# Patient Record
Sex: MALE | Race: WHITE | NOT HISPANIC OR LATINO | Employment: FULL TIME | URBAN - METROPOLITAN AREA
[De-identification: names, ages, dates, MRNs, and addresses within clinical notes are randomized per-mention and may not be internally consistent; named-entity substitution may affect disease eponyms.]

---

## 2023-02-13 PROBLEM — G47.00 INSOMNIA: Status: ACTIVE | Noted: 2023-02-13

## 2023-02-13 PROBLEM — F41.9 ANXIETY: Status: ACTIVE | Noted: 2023-02-13

## 2023-02-13 PROBLEM — E66.3 OVERWEIGHT WITH BODY MASS INDEX (BMI) OF 26 TO 26.9 IN ADULT: Status: ACTIVE | Noted: 2023-02-13

## 2023-02-13 RX ORDER — ESCITALOPRAM OXALATE 10 MG/1
1 TABLET ORAL DAILY
COMMUNITY
Start: 2022-05-04 | End: 2023-03-24 | Stop reason: SDUPTHER

## 2023-03-24 ENCOUNTER — OFFICE VISIT (OUTPATIENT)
Dept: PRIMARY CARE | Facility: CLINIC | Age: 38
End: 2023-03-24
Payer: COMMERCIAL

## 2023-03-24 VITALS
SYSTOLIC BLOOD PRESSURE: 122 MMHG | HEART RATE: 93 BPM | TEMPERATURE: 97 F | DIASTOLIC BLOOD PRESSURE: 86 MMHG | HEIGHT: 70 IN | BODY MASS INDEX: 26.4 KG/M2 | WEIGHT: 184.4 LBS

## 2023-03-24 DIAGNOSIS — G47.00 INSOMNIA, UNSPECIFIED TYPE: ICD-10-CM

## 2023-03-24 DIAGNOSIS — F41.9 ANXIETY: Primary | ICD-10-CM

## 2023-03-24 PROCEDURE — 1036F TOBACCO NON-USER: CPT | Performed by: NURSE PRACTITIONER

## 2023-03-24 PROCEDURE — 99213 OFFICE O/P EST LOW 20 MIN: CPT | Performed by: NURSE PRACTITIONER

## 2023-03-24 RX ORDER — ESCITALOPRAM OXALATE 10 MG/1
10 TABLET ORAL DAILY
Qty: 90 TABLET | Refills: 1 | Status: SHIPPED | OUTPATIENT
Start: 2023-03-24

## 2023-03-24 ASSESSMENT — ENCOUNTER SYMPTOMS
VOMITING: 0
DIARRHEA: 0
ABDOMINAL PAIN: 0
COUGH: 0
NAUSEA: 0
CHILLS: 0
WEAKNESS: 0
DIZZINESS: 0
PALPITATIONS: 0
FEVER: 0
SHORTNESS OF BREATH: 0
HEADACHES: 0
FATIGUE: 0
CHEST TIGHTNESS: 0
NUMBNESS: 0

## 2023-03-24 NOTE — PATIENT INSTRUCTIONS
Continue to focus on a healthy diet and exercise.   Continue medications at the current dose.  Follow up in 6 months.

## 2023-03-24 NOTE — PROGRESS NOTES
"Subjective   Jeovany Olivera is a 37 y.o. male who presents for Follow-up (Med refills).    HPI   He reports he has been doing well.  Taking the medication as prescribed. Taking daily at night time before bed  Side effects: No side effects noted.  No feeling down, helpless or hopeless.  Motivation: Overall pretty good.  No SI or HI.  No excessive worry.  No worry about worst case scenarios.  Panic attacks: No panic attacks.  Still irritable at times (work related)- overall much better.  Counseling: No counseling.  Sleep: No problems falling asleep or staying asleep. Sleeps about 6-7 hours a night. Significant improvement.  Diet: Overall pretty healthy.  Exercise: Walks laps around the building for lunch time (1.5 miles)- daily during the week.  Caffeine use: 1.5 cups of coffee every morning.  Alcohol use: (+) social on the weekends  Drug use: No drug use.    Review of Systems   Constitutional:  Negative for chills, fatigue and fever.   Eyes:  Negative for visual disturbance.   Respiratory:  Negative for cough, chest tightness and shortness of breath.    Cardiovascular:  Negative for chest pain, palpitations and leg swelling.   Gastrointestinal:  Negative for abdominal pain, diarrhea, nausea and vomiting.   Neurological:  Negative for dizziness, weakness, numbness and headaches.       Objective   /86 (BP Location: Left arm, Patient Position: Sitting)   Pulse 93   Temp 36.1 °C (97 °F) (Temporal)   Ht 1.778 m (5' 10\")   Wt 83.6 kg (184 lb 6.4 oz)   BMI 26.46 kg/m²     Physical Exam  Constitutional:       General: He is not in acute distress.     Appearance: Normal appearance. He is not toxic-appearing.   Eyes:      Extraocular Movements: Extraocular movements intact.      Conjunctiva/sclera: Conjunctivae normal.      Pupils: Pupils are equal, round, and reactive to light.   Cardiovascular:      Rate and Rhythm: Normal rate and regular rhythm.      Pulses: Normal pulses.      Heart sounds: Normal heart " sounds, S1 normal and S2 normal. No murmur heard.  Pulmonary:      Effort: Pulmonary effort is normal. No respiratory distress.      Breath sounds: Normal breath sounds.   Abdominal:      General: Abdomen is flat. Bowel sounds are normal.      Palpations: Abdomen is soft.      Tenderness: There is no abdominal tenderness.   Musculoskeletal:      Cervical back: Normal range of motion.      Right lower leg: No edema.      Left lower leg: No edema.   Lymphadenopathy:      Cervical: No cervical adenopathy.   Neurological:      Mental Status: He is alert and oriented to person, place, and time.   Psychiatric:         Attention and Perception: Attention normal.         Mood and Affect: Mood and affect normal.         Behavior: Behavior normal. Behavior is cooperative.         Thought Content: Thought content normal.         Cognition and Memory: Cognition normal.         Judgment: Judgment normal.         Assessment/Plan   Problem List Items Addressed This Visit          Nervous    Insomnia     Significant improvement since starting the Lexapro.            Other    Anxiety - Primary     Well controlled on Lexapro. Continue on current dose.         Relevant Medications    escitalopram (Lexapro) 10 mg tablet

## 2023-09-13 ENCOUNTER — OFFICE VISIT (OUTPATIENT)
Dept: PRIMARY CARE | Facility: CLINIC | Age: 38
End: 2023-09-13
Payer: COMMERCIAL

## 2023-09-13 VITALS
DIASTOLIC BLOOD PRESSURE: 82 MMHG | HEART RATE: 78 BPM | HEIGHT: 69 IN | BODY MASS INDEX: 27.88 KG/M2 | WEIGHT: 188.2 LBS | OXYGEN SATURATION: 96 % | TEMPERATURE: 97.5 F | SYSTOLIC BLOOD PRESSURE: 132 MMHG

## 2023-09-13 DIAGNOSIS — R42 DIZZINESS: Primary | ICD-10-CM

## 2023-09-13 PROCEDURE — 1036F TOBACCO NON-USER: CPT | Performed by: NURSE PRACTITIONER

## 2023-09-13 PROCEDURE — 99213 OFFICE O/P EST LOW 20 MIN: CPT | Performed by: NURSE PRACTITIONER

## 2023-09-13 ASSESSMENT — ENCOUNTER SYMPTOMS
PHOTOPHOBIA: 0
SPEECH DIFFICULTY: 0
WEAKNESS: 0
NAUSEA: 1
HEADACHES: 0
FEVER: 0
CHILLS: 0
NUMBNESS: 0
FACIAL ASYMMETRY: 0
VOMITING: 0
DIZZINESS: 1
FATIGUE: 0

## 2023-09-13 NOTE — PROGRESS NOTES
"Subjective   Jeovany Olivera is a 38 y.o. male who presents for Dizziness (Feels like rocking on a boat. Started around labor day.) and Flu Vaccine (Patient asked/ declined today).    Dizziness  Associated symptoms include nausea. Pertinent negatives include no chills, fatigue, fever, headaches, numbness, vomiting or weakness.     He presents to the office today for evaluation of dizziness. He reports he went on a 3 day cruise over Labor day weekend to [facility]. Reports some dizziness and nausea on the cruise but no vomiting.  Took Dramamine regularly.  He reports he had THC for the first time the Monday after returning from the trip.  If he moves quickly becomes nauseated and the symptoms temporarily get worse. No vomiting.  Since getting off the ship has had a constant rocking sensation.  Difficulty focusing.  Reports some mild improvement over the past week.  No vision changes. No double vision, blurred vision or loss of vision.  No speech difficulty. No coordination issues.  (+) pressure in the temples but no actual headache.  No numbness, tingling, weakness,  No chest pain, SOB or palpitations.  No ear fullness, popping or ringing.  No fever or chills.  He does have a history of motion sickness in the past.    Review of Systems   Constitutional:  Negative for chills, fatigue and fever.   HENT:  Negative for hearing loss and tinnitus.    Eyes:  Negative for photophobia and visual disturbance.   Gastrointestinal:  Positive for nausea. Negative for vomiting.   Neurological:  Positive for dizziness. Negative for facial asymmetry, speech difficulty, weakness, numbness and headaches.       Objective   /82 (BP Location: Left arm, Patient Position: Sitting)   Pulse 78   Temp 36.4 °C (97.5 °F)   Ht 1.753 m (5' 9\")   Wt 85.4 kg (188 lb 3.2 oz)   SpO2 96%   BMI 27.79 kg/m²     Physical Exam  Constitutional:       General: He is not in acute distress.     Appearance: Normal appearance. He is not " toxic-appearing.   HENT:      Right Ear: Hearing, tympanic membrane, ear canal and external ear normal.      Left Ear: Hearing, tympanic membrane, ear canal and external ear normal.   Eyes:      Extraocular Movements: Extraocular movements intact.      Conjunctiva/sclera: Conjunctivae normal.      Pupils: Pupils are equal, round, and reactive to light.   Neck:      Vascular: No carotid bruit.   Cardiovascular:      Rate and Rhythm: Normal rate and regular rhythm.      Heart sounds: Normal heart sounds, S1 normal and S2 normal. No murmur heard.  Pulmonary:      Effort: Pulmonary effort is normal.      Breath sounds: Normal breath sounds and air entry.   Musculoskeletal:      Right lower leg: No edema.      Left lower leg: No edema.   Lymphadenopathy:      Cervical: No cervical adenopathy.   Neurological:      Mental Status: He is alert and oriented to person, place, and time.      Cranial Nerves: Cranial nerves 2-12 are intact.      Sensory: Sensation is intact.      Motor: Motor function is intact.      Coordination: Coordination is intact.      Gait: Gait is intact.      Deep Tendon Reflexes: Reflexes are normal and symmetric.   Psychiatric:         Mood and Affect: Mood normal.         Behavior: Behavior normal.         Thought Content: Thought content normal.         Judgment: Judgment normal.     Assessment/Plan   Problem List Items Addressed This Visit       Dizziness - Primary     Normal neuro exam noted. Suspect Disembarkment syndrome from recent cruise.            It has been a pleasure seeing you today!

## 2024-08-16 ENCOUNTER — LAB (OUTPATIENT)
Dept: LAB | Facility: LAB | Age: 39
End: 2024-08-16
Payer: COMMERCIAL

## 2024-08-16 ENCOUNTER — APPOINTMENT (OUTPATIENT)
Dept: PRIMARY CARE | Facility: CLINIC | Age: 39
End: 2024-08-16
Payer: COMMERCIAL

## 2024-08-16 VITALS
OXYGEN SATURATION: 97 % | TEMPERATURE: 97.7 F | DIASTOLIC BLOOD PRESSURE: 100 MMHG | SYSTOLIC BLOOD PRESSURE: 135 MMHG | WEIGHT: 189.8 LBS | BODY MASS INDEX: 28.03 KG/M2 | HEART RATE: 74 BPM

## 2024-08-16 DIAGNOSIS — R03.0 ELEVATED BLOOD PRESSURE READING: ICD-10-CM

## 2024-08-16 DIAGNOSIS — K59.00 CONSTIPATION, UNSPECIFIED CONSTIPATION TYPE: ICD-10-CM

## 2024-08-16 DIAGNOSIS — R55 VASOVAGAL SYNCOPE: ICD-10-CM

## 2024-08-16 DIAGNOSIS — F41.9 ANXIETY: ICD-10-CM

## 2024-08-16 DIAGNOSIS — R10.9 ABDOMINAL CRAMPING: Primary | ICD-10-CM

## 2024-08-16 PROBLEM — R42 DIZZINESS: Status: RESOLVED | Noted: 2023-09-13 | Resolved: 2024-08-16

## 2024-08-16 LAB
ALBUMIN SERPL BCP-MCNC: 4.9 G/DL (ref 3.4–5)
ALP SERPL-CCNC: 72 U/L (ref 33–120)
ALT SERPL W P-5'-P-CCNC: 45 U/L (ref 10–52)
ANION GAP SERPL CALC-SCNC: 14 MMOL/L (ref 10–20)
AST SERPL W P-5'-P-CCNC: 20 U/L (ref 9–39)
BASOPHILS # BLD AUTO: 0.05 X10*3/UL (ref 0–0.1)
BASOPHILS NFR BLD AUTO: 1 %
BILIRUB SERPL-MCNC: 0.4 MG/DL (ref 0–1.2)
BUN SERPL-MCNC: 14 MG/DL (ref 6–23)
CALCIUM SERPL-MCNC: 10.1 MG/DL (ref 8.6–10.3)
CHLORIDE SERPL-SCNC: 102 MMOL/L (ref 98–107)
CO2 SERPL-SCNC: 28 MMOL/L (ref 21–32)
CREAT SERPL-MCNC: 1.07 MG/DL (ref 0.5–1.3)
EGFRCR SERPLBLD CKD-EPI 2021: >90 ML/MIN/1.73M*2
EOSINOPHIL # BLD AUTO: 0.15 X10*3/UL (ref 0–0.7)
EOSINOPHIL NFR BLD AUTO: 3 %
ERYTHROCYTE [DISTWIDTH] IN BLOOD BY AUTOMATED COUNT: 12 % (ref 11.5–14.5)
GLUCOSE SERPL-MCNC: 94 MG/DL (ref 74–99)
HCT VFR BLD AUTO: 49.1 % (ref 41–52)
HGB BLD-MCNC: 16.6 G/DL (ref 13.5–17.5)
IMM GRANULOCYTES # BLD AUTO: 0 X10*3/UL (ref 0–0.7)
IMM GRANULOCYTES NFR BLD AUTO: 0 % (ref 0–0.9)
LYMPHOCYTES # BLD AUTO: 1.97 X10*3/UL (ref 1.2–4.8)
LYMPHOCYTES NFR BLD AUTO: 39.2 %
MCH RBC QN AUTO: 30.4 PG (ref 26–34)
MCHC RBC AUTO-ENTMCNC: 33.8 G/DL (ref 32–36)
MCV RBC AUTO: 90 FL (ref 80–100)
MONOCYTES # BLD AUTO: 0.52 X10*3/UL (ref 0.1–1)
MONOCYTES NFR BLD AUTO: 10.3 %
NEUTROPHILS # BLD AUTO: 2.34 X10*3/UL (ref 1.2–7.7)
NEUTROPHILS NFR BLD AUTO: 46.5 %
NRBC BLD-RTO: 0 /100 WBCS (ref 0–0)
PLATELET # BLD AUTO: 325 X10*3/UL (ref 150–450)
POTASSIUM SERPL-SCNC: 4.3 MMOL/L (ref 3.5–5.3)
PROT SERPL-MCNC: 7.5 G/DL (ref 6.4–8.2)
RBC # BLD AUTO: 5.46 X10*6/UL (ref 4.5–5.9)
SODIUM SERPL-SCNC: 140 MMOL/L (ref 136–145)
TSH SERPL-ACNC: 2.36 MIU/L (ref 0.44–3.98)
WBC # BLD AUTO: 5 X10*3/UL (ref 4.4–11.3)

## 2024-08-16 PROCEDURE — 36415 COLL VENOUS BLD VENIPUNCTURE: CPT

## 2024-08-16 PROCEDURE — 84443 ASSAY THYROID STIM HORMONE: CPT

## 2024-08-16 PROCEDURE — 85025 COMPLETE CBC W/AUTO DIFF WBC: CPT

## 2024-08-16 PROCEDURE — 99214 OFFICE O/P EST MOD 30 MIN: CPT | Performed by: NURSE PRACTITIONER

## 2024-08-16 PROCEDURE — 1036F TOBACCO NON-USER: CPT | Performed by: NURSE PRACTITIONER

## 2024-08-16 PROCEDURE — 80053 COMPREHEN METABOLIC PANEL: CPT

## 2024-08-16 RX ORDER — ESCITALOPRAM OXALATE 10 MG/1
10 TABLET ORAL DAILY
Qty: 90 TABLET | Refills: 1 | Status: SHIPPED | OUTPATIENT
Start: 2024-08-16

## 2024-08-16 ASSESSMENT — ENCOUNTER SYMPTOMS
CONSTIPATION: 1
NUMBNESS: 0
SLEEP DISTURBANCE: 0
PALPITATIONS: 0
CHEST TIGHTNESS: 0
SHORTNESS OF BREATH: 0
WEAKNESS: 0
DYSPHORIC MOOD: 0
ABDOMINAL PAIN: 1
NAUSEA: 1
CHILLS: 0
DIZZINESS: 0
COUGH: 0
FATIGUE: 0
DIARRHEA: 0
HEADACHES: 0
FEVER: 0
VOMITING: 0
NERVOUS/ANXIOUS: 0
BLOOD IN STOOL: 0

## 2024-08-16 ASSESSMENT — PATIENT HEALTH QUESTIONNAIRE - PHQ9
1. LITTLE INTEREST OR PLEASURE IN DOING THINGS: NOT AT ALL
SUM OF ALL RESPONSES TO PHQ9 QUESTIONS 1 AND 2: 0
2. FEELING DOWN, DEPRESSED OR HOPELESS: NOT AT ALL

## 2024-08-16 NOTE — PROGRESS NOTES
Subjective   Jeovany Olivera is a 39 y.o. male who presents for Abdominal Pain (Pt reported he has always had constipation issues. Last month he passed out because his abdominal pain was so bad. Pt report he does not have a GI provider.) and Med Refill.    HPI  He presents to the office today for abdominal concerns.   He reports he has struggled with constipation since high school.  Normal BM schedule is about twice a week. Generally harder small pieces. Does not always feel he empties completely after a BM.  About twice a year since college gets severe cramping in the abdomen/ feels he needs to have a BM.   Gets sweaty, nauseated and dizzy- feel like could pass out.  About a month ago he reports he passed out when this happened. (No other symptoms during this time- no chest pain, SOB, palpitations, headaches, numbness, tingling, weakness.)  Woke up and felt fine. The cramping started again until having BM.  This occurred in the middle of the night after eating late around midnight)- woke up with cramping.  Beginning of stool is hard and then once out become soft and liquid.  No blood in stool. No mucous.  No fever or chills.  No abdominal pain outside of the episodes.  No vomiting.   Immediate relief of cramping.  At times can be food he is eating.  Has never been evaluated in the past.  Added fiber which has helped some but also can cause bloating.    He is taking the Lexapro as prescribed. Taking daily at night.  No side effects.  No feeling sad, down, helpless or hopeless.  Motivation is good.  No SI or HI.  No excessive worry.  No worry about worst case scenarios.  No panic attacks.  Sleeping well at night.  Diet:Overall pretty healthy  Exercise:2 miles of walking at work/Cycling on the weekends.  Caffeine use: 1 large coffee in the morning  Alcohol use: Social alcohol  Drug use: None    Review of Systems   Constitutional:  Negative for chills, fatigue and fever.   Eyes:  Negative for visual disturbance.    Respiratory:  Negative for cough, chest tightness and shortness of breath.    Cardiovascular:  Negative for chest pain, palpitations and leg swelling.   Gastrointestinal:  Positive for abdominal pain, constipation and nausea. Negative for blood in stool, diarrhea and vomiting.   Neurological:  Positive for syncope. Negative for dizziness, weakness, numbness and headaches.   Psychiatric/Behavioral:  Negative for dysphoric mood, self-injury, sleep disturbance and suicidal ideas. The patient is not nervous/anxious.        Objective   BP (!) 135/100   Pulse 74   Temp 36.5 °C (97.7 °F) (Temporal)   Wt 86.1 kg (189 lb 12.8 oz)   SpO2 97%   BMI 28.03 kg/m²     Physical Exam  Constitutional:       General: He is not in acute distress.     Appearance: Normal appearance. He is not toxic-appearing.   Eyes:      Extraocular Movements: Extraocular movements intact.      Conjunctiva/sclera: Conjunctivae normal.      Pupils: Pupils are equal, round, and reactive to light.   Neck:      Vascular: No carotid bruit.   Cardiovascular:      Rate and Rhythm: Normal rate and regular rhythm.      Pulses: Normal pulses.      Heart sounds: Normal heart sounds, S1 normal and S2 normal. No murmur heard.  Pulmonary:      Effort: Pulmonary effort is normal. No respiratory distress.      Breath sounds: Normal breath sounds.   Abdominal:      General: Bowel sounds are normal.      Palpations: Abdomen is soft.      Tenderness: There is no abdominal tenderness.   Musculoskeletal:      Right lower leg: No edema.      Left lower leg: No edema.   Lymphadenopathy:      Cervical: No cervical adenopathy.   Neurological:      Mental Status: He is alert and oriented to person, place, and time.   Psychiatric:         Attention and Perception: Attention normal.         Mood and Affect: Mood and affect normal.         Behavior: Behavior normal. Behavior is cooperative.         Thought Content: Thought content normal.         Cognition and Memory:  Cognition normal.         Judgment: Judgment normal.     Assessment/Plan   Problem List Items Addressed This Visit       Anxiety     Well controlled on the current dose of Lexapro. Continue.         Relevant Medications    escitalopram (Lexapro) 10 mg tablet    Vasovagal syncope     This occurred in the setting of severe abdominal cramping and needing to have a bowel movement. No other associated symptoms reported.          Abdominal cramping - Primary    Relevant Orders    Referral to Gastroenterology    Constipation    Relevant Orders    Comprehensive Metabolic Panel    TSH with reflex to Free T4 if abnormal (Completed)    CBC and Auto Differential (Completed)    Referral to Gastroenterology    Elevated blood pressure reading     Check home BP readings and follow up with home monitor & readings in one month to recheck.             It has been a pleasure seeing you today!

## 2024-08-16 NOTE — PATIENT INSTRUCTIONS
Obtain the blood work as ordered today.  Referral to GI.  Focus on a low sodium/low fat diet (whole grains, fresh fruits and vegetables, lean meats). Avoid foods high in sugar.  Increase exercise as tolerated.  Get a home BP cuff and check 3 times a week.   Follow up in one month.

## 2024-08-17 NOTE — ASSESSMENT & PLAN NOTE
This occurred in the setting of severe abdominal cramping and needing to have a bowel movement. No other associated symptoms reported.

## 2024-08-19 ENCOUNTER — TELEPHONE (OUTPATIENT)
Dept: PRIMARY CARE | Facility: CLINIC | Age: 39
End: 2024-08-19
Payer: COMMERCIAL

## 2024-08-19 NOTE — TELEPHONE ENCOUNTER
----- Message from Yessi Chadwick sent at 8/16/2024 10:52 PM EDT -----  Please let him know his labs looked great.

## 2024-09-18 ENCOUNTER — APPOINTMENT (OUTPATIENT)
Dept: PRIMARY CARE | Facility: CLINIC | Age: 39
End: 2024-09-18
Payer: COMMERCIAL

## 2024-10-03 ENCOUNTER — OFFICE VISIT (OUTPATIENT)
Dept: GASTROENTEROLOGY | Facility: CLINIC | Age: 39
End: 2024-10-03
Payer: COMMERCIAL

## 2024-10-03 VITALS — WEIGHT: 188.6 LBS | HEIGHT: 69 IN | BODY MASS INDEX: 27.93 KG/M2

## 2024-10-03 DIAGNOSIS — R10.9 ABDOMINAL CRAMPING: ICD-10-CM

## 2024-10-03 DIAGNOSIS — K59.00 CONSTIPATION, UNSPECIFIED CONSTIPATION TYPE: ICD-10-CM

## 2024-10-03 PROCEDURE — 3008F BODY MASS INDEX DOCD: CPT | Performed by: INTERNAL MEDICINE

## 2024-10-03 PROCEDURE — 99204 OFFICE O/P NEW MOD 45 MIN: CPT | Performed by: INTERNAL MEDICINE

## 2024-10-03 PROCEDURE — 1036F TOBACCO NON-USER: CPT | Performed by: INTERNAL MEDICINE

## 2024-10-03 NOTE — PATIENT INSTRUCTIONS
Take Miralax 17 gm every day in 8-16 oz of water in the morning  Take 1 scoop (30 gms) of fiber supplements like metamucil or citrucel twice a day with 8-16 oz of fluids. Eat a high fiber diet.  Drink plenty of fluids through the day (64 oz or more)

## 2024-10-03 NOTE — ASSESSMENT & PLAN NOTE
I will ask the patient to start taking MiraLAX and Metamucil  I will assess response to these over-the-counter medicines  We talked about doing a colonoscopy.  However due to the absence of any red flags, we will hold off on doing the scope for now    Orders:    Referral to Gastroenterology    Follow Up In Gastroenterology; Future

## 2024-10-03 NOTE — PROGRESS NOTES
Primary Care Provider: Yessi Chadwick, APRN-CNP  Referring Provider: Yessi Chadwick, APR*  My final recommendations will be communicated back to the referring physician and/or primary care provider via shared medical records or via US mail.    Chief Complaint (Reason for visit):   Jeovany Olivera is a 39 y.o. male who presents for abdominal cramps and constipation    ASSESSMENT AND PLAN     Assessment & Plan  Constipation, unspecified constipation type  I will ask the patient to start taking MiraLAX and Metamucil  I will assess response to these over-the-counter medicines  We talked about doing a colonoscopy.  However due to the absence of any red flags, we will hold off on doing the scope for now    Orders:    Referral to Gastroenterology    Follow Up In Gastroenterology; Future    Abdominal cramping  He also discussed smooth muscle relaxants like Bentyl or hyoscyamine.  However he has infrequent symptoms.  Furthermore this medicines would likely make his constipation worse.  I will hold off on prescribing any of the smooth muscle relaxers for now    Orders:    Referral to Gastroenterology    Follow Up In Gastroenterology; Future      I discussed the risks, benefits and alternative(s) of the procedure(s) with the patient. Pt verbalizes understanding and is willing to proceed. All questions were answered.    Sarah Simeon MD, MS  10/3/2024    SUBJECTIVE   HPI: History obtained from patient    Pt comes to see me for abdominal cramps and constipation.    Patient states that he has a tendency for constipation his whole life.  He takes over-the-counter medicines as necessary.  But twice in the ER he gets severe cramps in his abdomen followed by bowel movement.  Once he has bowel movements his cramping is resolved.  Recently during 1 of these episodes, patient passed out.  He went to see his primary care doctor and was diagnosed with vasovagal syncope.  GI consultation was recommended    Other than above,  "patient has no other GI related symptoms.  In between the episodes he feels completely normal.    REDFLAGS  Pt denies any blood in stool, black stools  Pt denies unintentional weight loss, fever, chills, night sweats  Pt denies family history of GI cancer    No previous scopes  No past medical history on file.    Past Surgical History:   Procedure Laterality Date    WISDOM TOOTH EXTRACTION         Current Outpatient Medications   Medication Sig Dispense Refill    escitalopram (Lexapro) 10 mg tablet Take 1 tablet (10 mg) by mouth once daily. 90 tablet 1    Lactobacillus acidophilus (PROBIOTIC ORAL) Take by mouth.      LUTEIN ORAL Take by mouth.      psyllium seed, with sugar, (FIBER ORAL) Take by mouth.       No current facility-administered medications for this visit.       No Known Allergies  OBJECTIVE   PHYSICAL EXAM:  Ht 1.753 m (5' 9\")   Wt 85.5 kg (188 lb 9.6 oz)   BMI 27.85 kg/m²      LABS/IMAGING/SCOPES  Lab Results   Component Value Date    WBC 5.0 08/16/2024    HGB 16.6 08/16/2024    HCT 49.1 08/16/2024    MCV 90 08/16/2024     08/16/2024     Lab Results   Component Value Date    GLUCOSE 94 08/16/2024    CALCIUM 10.1 08/16/2024     08/16/2024    K 4.3 08/16/2024    CO2 28 08/16/2024     08/16/2024    BUN 14 08/16/2024    CREATININE 1.07 08/16/2024     Lab Results   Component Value Date    ALT 45 08/16/2024    AST 20 08/16/2024    ALKPHOS 72 08/16/2024    BILITOT 0.4 08/16/2024           Sarah Simeon MD, MS  10/3/2024  "

## 2024-10-03 NOTE — LETTER
October 3, 2024     RULA Cummings  [address]  [facility]  [facility] [address]    Patient: Jeovany Olivera   YOB: 1985   Date of Visit: 10/3/2024       Dear YENY Jones-CNP:    Thank you for referring Jeovany Olivera to me for evaluation. Below are my notes for this consultation.  If you have questions, please do not hesitate to call me. I look forward to following your patient along with you.       Sincerely,     Sarah Simeon MD, MS      CC: No Recipients  ______________________________________________________________________________________    Primary Care Provider: RULA Cummings  Referring Provider: Yessi Chadwick, APR*  My final recommendations will be communicated back to the referring physician and/or primary care provider via shared medical records or via US mail.    Chief Complaint (Reason for visit):   Jeovany Olivera is a 39 y.o. male who presents for abdominal cramps and constipation    ASSESSMENT AND PLAN     Assessment & Plan  Constipation, unspecified constipation type  I will ask the patient to start taking MiraLAX and Metamucil  I will assess response to these over-the-counter medicines  We talked about doing a colonoscopy.  However due to the absence of any red flags, we will hold off on doing the scope for now    Orders:  •  Referral to Gastroenterology  •  Follow Up In Gastroenterology; Future    Abdominal cramping  He also discussed smooth muscle relaxants like Bentyl or hyoscyamine.  However he has infrequent symptoms.  Furthermore this medicines would likely make his constipation worse.  I will hold off on prescribing any of the smooth muscle relaxers for now    Orders:  •  Referral to Gastroenterology  •  Follow Up In Gastroenterology; Future      I discussed the risks, benefits and alternative(s) of the procedure(s) with the patient. Pt verbalizes understanding and is willing to proceed. All  "questions were answered.    Sarah Simeon MD, MS  10/3/2024    SUBJECTIVE   HPI: History obtained from patient    Pt comes to see me for abdominal cramps and constipation.    Patient states that he has a tendency for constipation his whole life.  He takes over-the-counter medicines as necessary.  But twice in the ER he gets severe cramps in his abdomen followed by bowel movement.  Once he has bowel movements his cramping is resolved.  Recently during 1 of these episodes, patient passed out.  He went to see his primary care doctor and was diagnosed with vasovagal syncope.  GI consultation was recommended    Other than above, patient has no other GI related symptoms.  In between the episodes he feels completely normal.    REDFLAGS  Pt denies any blood in stool, black stools  Pt denies unintentional weight loss, fever, chills, night sweats  Pt denies family history of GI cancer    No previous scopes  No past medical history on file.    Past Surgical History:   Procedure Laterality Date   • WISDOM TOOTH EXTRACTION         Current Outpatient Medications   Medication Sig Dispense Refill   • escitalopram (Lexapro) 10 mg tablet Take 1 tablet (10 mg) by mouth once daily. 90 tablet 1   • Lactobacillus acidophilus (PROBIOTIC ORAL) Take by mouth.     • LUTEIN ORAL Take by mouth.     • psyllium seed, with sugar, (FIBER ORAL) Take by mouth.       No current facility-administered medications for this visit.       No Known Allergies  OBJECTIVE   PHYSICAL EXAM:  Ht 1.753 m (5' 9\")   Wt 85.5 kg (188 lb 9.6 oz)   BMI 27.85 kg/m²      LABS/IMAGING/SCOPES  Lab Results   Component Value Date    WBC 5.0 08/16/2024    HGB 16.6 08/16/2024    HCT 49.1 08/16/2024    MCV 90 08/16/2024     08/16/2024     Lab Results   Component Value Date    GLUCOSE 94 08/16/2024    CALCIUM 10.1 08/16/2024     08/16/2024    K 4.3 08/16/2024    CO2 28 08/16/2024     08/16/2024    BUN 14 08/16/2024    CREATININE 1.07 08/16/2024     Lab " Results   Component Value Date    ALT 45 08/16/2024    AST 20 08/16/2024    ALKPHOS 72 08/16/2024    BILITOT 0.4 08/16/2024           Sarah Simeon MD, MS  10/3/2024

## 2024-10-03 NOTE — ASSESSMENT & PLAN NOTE
He also discussed smooth muscle relaxants like Bentyl or hyoscyamine.  However he has infrequent symptoms.  Furthermore this medicines would likely make his constipation worse.  I will hold off on prescribing any of the smooth muscle relaxers for now    Orders:    Referral to Gastroenterology    Follow Up In Gastroenterology; Future

## 2024-11-18 ENCOUNTER — APPOINTMENT (OUTPATIENT)
Dept: PRIMARY CARE | Facility: CLINIC | Age: 39
End: 2024-11-18
Payer: COMMERCIAL

## 2024-11-18 VITALS
DIASTOLIC BLOOD PRESSURE: 92 MMHG | SYSTOLIC BLOOD PRESSURE: 142 MMHG | WEIGHT: 191 LBS | BODY MASS INDEX: 28.21 KG/M2 | HEART RATE: 80 BPM | TEMPERATURE: 97.9 F | OXYGEN SATURATION: 97 %

## 2024-11-18 DIAGNOSIS — R03.0 ELEVATED BLOOD PRESSURE READING: Primary | ICD-10-CM

## 2024-11-18 PROCEDURE — 99213 OFFICE O/P EST LOW 20 MIN: CPT | Performed by: NURSE PRACTITIONER

## 2024-11-18 PROCEDURE — 1036F TOBACCO NON-USER: CPT | Performed by: NURSE PRACTITIONER

## 2024-11-18 ASSESSMENT — ENCOUNTER SYMPTOMS
FATIGUE: 0
WEAKNESS: 0
CHEST TIGHTNESS: 0
DIARRHEA: 0
ABDOMINAL PAIN: 0
FEVER: 0
PALPITATIONS: 0
CHILLS: 0
NUMBNESS: 0
VOMITING: 0
SHORTNESS OF BREATH: 0
COUGH: 0
HEADACHES: 0
DIZZINESS: 0
NAUSEA: 0

## 2024-11-18 ASSESSMENT — PATIENT HEALTH QUESTIONNAIRE - PHQ9
1. LITTLE INTEREST OR PLEASURE IN DOING THINGS: NOT AT ALL
2. FEELING DOWN, DEPRESSED OR HOPELESS: NOT AT ALL
SUM OF ALL RESPONSES TO PHQ9 QUESTIONS 1 AND 2: 0

## 2024-11-18 NOTE — PROGRESS NOTES
Subjective   Jeovany Olivera is a 39 y.o. male who presents for Blood Pressure Check (pt brought BP machine in office today and went over patient education on how to properly wear the cuff at home. /) and Flu Vaccine (Pt denied).    HPI  He presents to the office today for blood pressure follow up. He reports he is feeling well.  No chest pain, shortness of breath, palpitations or edema. No headaches, numbness, tingling, weakness or vision changes.  No dizziness.  Home blood pressure reading 117-132/80-99  Check about once a month since last visit.  Diet: generally pretty healthy  Exercise: No scheduled exercise/ walks 2 miles at lunch  Weight:fairly stable.    He reports he saw GI last month- started Fiber powder (instead of pills) and Miralax- taking 1-2 times a week.  Overall symptoms much better and bowel movements more regular.    Last labs: Reviewed CMP, CBC, TSH from 8/16/2024 today.  Lab Results   Component Value Date    WBC 5.0 08/16/2024    HGB 16.6 08/16/2024    HCT 49.1 08/16/2024    MCV 90 08/16/2024     08/16/2024      Lab Results   Component Value Date    GLUCOSE 94 08/16/2024    CALCIUM 10.1 08/16/2024     08/16/2024    K 4.3 08/16/2024    CO2 28 08/16/2024     08/16/2024    BUN 14 08/16/2024    CREATININE 1.07 08/16/2024      Lab Results   Component Value Date    TSH 2.36 08/16/2024      Review of Systems   Constitutional:  Negative for chills, fatigue and fever.   Eyes:  Negative for visual disturbance.   Respiratory:  Negative for cough, chest tightness and shortness of breath.    Cardiovascular:  Negative for chest pain, palpitations and leg swelling.   Gastrointestinal:  Negative for abdominal pain, diarrhea, nausea and vomiting.   Neurological:  Negative for dizziness, weakness, numbness and headaches.     Objective   BP (!) 142/92 (BP Location: Left arm, Patient Position: Sitting)   Pulse 80   Temp 36.6 °C (97.9 °F) (Temporal)   Wt 86.6 kg (191 lb)   SpO2 97%   BMI  28.21 kg/m²     Physical Exam  Constitutional:       General: He is not in acute distress.     Appearance: Normal appearance. He is not toxic-appearing.   Eyes:      Extraocular Movements: Extraocular movements intact.      Conjunctiva/sclera: Conjunctivae normal.      Pupils: Pupils are equal, round, and reactive to light.   Cardiovascular:      Rate and Rhythm: Normal rate and regular rhythm.      Pulses: Normal pulses.      Heart sounds: Normal heart sounds, S1 normal and S2 normal. No murmur heard.  Pulmonary:      Effort: Pulmonary effort is normal. No respiratory distress.      Breath sounds: Normal breath sounds.   Abdominal:      General: Bowel sounds are normal.      Palpations: Abdomen is soft.      Tenderness: There is no abdominal tenderness.   Musculoskeletal:      Right lower leg: No edema.      Left lower leg: No edema.   Lymphadenopathy:      Cervical: No cervical adenopathy.   Neurological:      Mental Status: He is alert and oriented to person, place, and time.   Psychiatric:         Attention and Perception: Attention normal.         Mood and Affect: Mood and affect normal.         Behavior: Behavior normal. Behavior is cooperative.         Thought Content: Thought content normal.         Cognition and Memory: Cognition normal.         Judgment: Judgment normal.         Assessment/Plan   Problem List Items Addressed This Visit       Elevated blood pressure reading - Primary   Remains slightly elevated today.  Focus on a low sodium diet and regular exercise.  Continue to monitor at home.  Follow up in 6 months.    It has been a pleasure seeing you today!

## 2024-11-18 NOTE — PATIENT INSTRUCTIONS
Focus on a low sodium/low fat diet (whole grains, fresh fruits and vegetables, lean meats). Avoid foods high in sugar.  Increase exercise as tolerated.  Follow up in 6 months or sooner if needed.

## 2025-01-07 ENCOUNTER — APPOINTMENT (OUTPATIENT)
Dept: GASTROENTEROLOGY | Facility: CLINIC | Age: 40
End: 2025-01-07
Payer: COMMERCIAL

## 2025-03-20 ENCOUNTER — OFFICE VISIT (OUTPATIENT)
Dept: PRIMARY CARE | Facility: CLINIC | Age: 40
End: 2025-03-20
Payer: COMMERCIAL

## 2025-03-20 VITALS
OXYGEN SATURATION: 98 % | WEIGHT: 185.6 LBS | TEMPERATURE: 97.7 F | SYSTOLIC BLOOD PRESSURE: 125 MMHG | BODY MASS INDEX: 27.41 KG/M2 | HEART RATE: 79 BPM | DIASTOLIC BLOOD PRESSURE: 87 MMHG

## 2025-03-20 DIAGNOSIS — F51.01 PRIMARY INSOMNIA: Primary | ICD-10-CM

## 2025-03-20 PROCEDURE — 1036F TOBACCO NON-USER: CPT | Performed by: STUDENT IN AN ORGANIZED HEALTH CARE EDUCATION/TRAINING PROGRAM

## 2025-03-20 PROCEDURE — 99214 OFFICE O/P EST MOD 30 MIN: CPT | Performed by: STUDENT IN AN ORGANIZED HEALTH CARE EDUCATION/TRAINING PROGRAM

## 2025-03-20 RX ORDER — MELATONIN 3 MG
6 CAPSULE ORAL NIGHTLY PRN
Qty: 120 CAPSULE | Refills: 1 | Status: SHIPPED | OUTPATIENT
Start: 2025-03-20

## 2025-03-20 ASSESSMENT — ENCOUNTER SYMPTOMS
SHORTNESS OF BREATH: 0
SLEEP DISTURBANCE: 1
FEVER: 0
CHILLS: 0
DYSPHORIC MOOD: 0
NERVOUS/ANXIOUS: 0

## 2025-03-20 NOTE — ASSESSMENT & PLAN NOTE
Orders:    melatonin 3 mg capsule; Take 6 mg by mouth as needed at bedtime (30-60 minutes prior to bedtime as needed for insomnia).

## 2025-03-20 NOTE — PROGRESS NOTES
Assessment/Plan   Assessment & Plan  Primary insomnia    Orders:    melatonin 3 mg capsule; Take 6 mg by mouth as needed at bedtime (30-60 minutes prior to bedtime as needed for insomnia).    Chronic, unstable    Subjective   Patient ID: Jeovany Olivera is a 39 y.o. male who presents for sleep issue (Has gotten maybe total 12 hrs in 4 days, has had this issue before. does take escotalopram for sleep but recently has not been working as well as in the past).  HPI    He is on anxiety medication   He has been on lexapro for anxiety    He states he has issues falling asleep.Discussed sleep hygiene with patient, does look at TV when he cannot fall asleep     Review of Systems   Constitutional:  Negative for chills and fever.   HENT:  Negative for congestion.    Respiratory:  Negative for shortness of breath.    Cardiovascular:  Negative for chest pain.   Psychiatric/Behavioral:  Positive for sleep disturbance. Negative for dysphoric mood. The patient is not nervous/anxious.        Objective   Physical Exam  Constitutional:       Appearance: Normal appearance.   Cardiovascular:      Rate and Rhythm: Normal rate and regular rhythm.   Pulmonary:      Breath sounds: Normal breath sounds.   Neurological:      Mental Status: He is alert.               Ben Callahan MD 03/20/25 11:50 AM

## 2025-05-21 ENCOUNTER — APPOINTMENT (OUTPATIENT)
Dept: PRIMARY CARE | Facility: CLINIC | Age: 40
End: 2025-05-21
Payer: COMMERCIAL

## 2025-05-21 VITALS
TEMPERATURE: 97.6 F | DIASTOLIC BLOOD PRESSURE: 104 MMHG | OXYGEN SATURATION: 94 % | WEIGHT: 190.4 LBS | HEART RATE: 89 BPM | BODY MASS INDEX: 28.12 KG/M2 | SYSTOLIC BLOOD PRESSURE: 142 MMHG

## 2025-05-21 DIAGNOSIS — I10 BENIGN ESSENTIAL HYPERTENSION: Primary | ICD-10-CM

## 2025-05-21 DIAGNOSIS — F41.9 ANXIETY: ICD-10-CM

## 2025-05-21 DIAGNOSIS — G47.00 INSOMNIA, UNSPECIFIED TYPE: ICD-10-CM

## 2025-05-21 PROCEDURE — 99214 OFFICE O/P EST MOD 30 MIN: CPT | Performed by: NURSE PRACTITIONER

## 2025-05-21 PROCEDURE — 3075F SYST BP GE 130 - 139MM HG: CPT | Performed by: NURSE PRACTITIONER

## 2025-05-21 PROCEDURE — 3080F DIAST BP >= 90 MM HG: CPT | Performed by: NURSE PRACTITIONER

## 2025-05-21 RX ORDER — LOSARTAN POTASSIUM 25 MG/1
25 TABLET ORAL DAILY
Qty: 90 TABLET | Refills: 0 | Status: SHIPPED | OUTPATIENT
Start: 2025-05-21

## 2025-05-21 RX ORDER — ESCITALOPRAM OXALATE 10 MG/1
10 TABLET ORAL DAILY
Qty: 90 TABLET | Refills: 1 | Status: SHIPPED | OUTPATIENT
Start: 2025-05-21

## 2025-05-21 ASSESSMENT — ENCOUNTER SYMPTOMS
PALPITATIONS: 0
ABDOMINAL PAIN: 0
NUMBNESS: 0
FATIGUE: 0
CHILLS: 0
COUGH: 0
HEADACHES: 0
DIARRHEA: 0
DYSPHORIC MOOD: 0
SHORTNESS OF BREATH: 0
DIZZINESS: 0
NERVOUS/ANXIOUS: 0
FEVER: 0
SLEEP DISTURBANCE: 0
VOMITING: 0
CHEST TIGHTNESS: 0
WEAKNESS: 0
NAUSEA: 0

## 2025-05-21 ASSESSMENT — PATIENT HEALTH QUESTIONNAIRE - PHQ9
2. FEELING DOWN, DEPRESSED OR HOPELESS: NOT AT ALL
SUM OF ALL RESPONSES TO PHQ9 QUESTIONS 1 AND 2: 0
1. LITTLE INTEREST OR PLEASURE IN DOING THINGS: NOT AT ALL

## 2025-05-22 PROBLEM — I10 BENIGN ESSENTIAL HYPERTENSION: Status: ACTIVE | Noted: 2025-05-22

## 2025-05-22 PROBLEM — R03.0 ELEVATED BLOOD PRESSURE READING: Status: RESOLVED | Noted: 2024-08-16 | Resolved: 2025-05-22

## 2025-08-13 LAB
ALBUMIN SERPL-MCNC: 4.6 G/DL (ref 3.6–5.1)
ALP SERPL-CCNC: 66 U/L (ref 36–130)
ALT SERPL-CCNC: 58 U/L (ref 9–46)
ANION GAP SERPL CALCULATED.4IONS-SCNC: 9 MMOL/L (CALC) (ref 7–17)
AST SERPL-CCNC: 26 U/L (ref 10–40)
BILIRUB SERPL-MCNC: 0.6 MG/DL (ref 0.2–1.2)
BUN SERPL-MCNC: 18 MG/DL (ref 7–25)
CALCIUM SERPL-MCNC: 9.5 MG/DL (ref 8.6–10.3)
CHLORIDE SERPL-SCNC: 103 MMOL/L (ref 98–110)
CHOLEST SERPL-MCNC: 160 MG/DL
CHOLEST/HDLC SERPL: 3.6 (CALC)
CO2 SERPL-SCNC: 29 MMOL/L (ref 20–32)
CREAT SERPL-MCNC: 1.11 MG/DL (ref 0.6–1.29)
EGFRCR SERPLBLD CKD-EPI 2021: 86 ML/MIN/1.73M2
ERYTHROCYTE [DISTWIDTH] IN BLOOD BY AUTOMATED COUNT: 12.5 % (ref 11–15)
GLUCOSE SERPL-MCNC: 91 MG/DL (ref 65–99)
HCT VFR BLD AUTO: 46.9 % (ref 38.5–50)
HDLC SERPL-MCNC: 45 MG/DL
HGB BLD-MCNC: 15.8 G/DL (ref 13.2–17.1)
LDLC SERPL CALC-MCNC: 88 MG/DL (CALC)
MCH RBC QN AUTO: 31.3 PG (ref 27–33)
MCHC RBC AUTO-ENTMCNC: 33.7 G/DL (ref 32–36)
MCV RBC AUTO: 92.9 FL (ref 80–100)
NONHDLC SERPL-MCNC: 115 MG/DL (CALC)
PLATELET # BLD AUTO: 274 THOUSAND/UL (ref 140–400)
PMV BLD REES-ECKER: 9.7 FL (ref 7.5–12.5)
POTASSIUM SERPL-SCNC: 4.5 MMOL/L (ref 3.5–5.3)
PROT SERPL-MCNC: 6.6 G/DL (ref 6.1–8.1)
RBC # BLD AUTO: 5.05 MILLION/UL (ref 4.2–5.8)
SODIUM SERPL-SCNC: 141 MMOL/L (ref 135–146)
TRIGL SERPL-MCNC: 174 MG/DL
WBC # BLD AUTO: 4.2 THOUSAND/UL (ref 3.8–10.8)

## 2025-08-20 ENCOUNTER — TELEPHONE (OUTPATIENT)
Dept: PRIMARY CARE | Facility: CLINIC | Age: 40
End: 2025-08-20
Payer: COMMERCIAL

## 2025-08-22 ENCOUNTER — APPOINTMENT (OUTPATIENT)
Dept: PRIMARY CARE | Facility: CLINIC | Age: 40
End: 2025-08-22
Payer: COMMERCIAL

## 2025-08-22 VITALS
BODY MASS INDEX: 28.47 KG/M2 | HEART RATE: 74 BPM | TEMPERATURE: 98 F | DIASTOLIC BLOOD PRESSURE: 94 MMHG | OXYGEN SATURATION: 94 % | WEIGHT: 192.8 LBS | SYSTOLIC BLOOD PRESSURE: 134 MMHG

## 2025-08-22 DIAGNOSIS — R74.8 ELEVATED LIVER ENZYMES: ICD-10-CM

## 2025-08-22 DIAGNOSIS — I10 BENIGN ESSENTIAL HYPERTENSION: Primary | ICD-10-CM

## 2025-08-22 PROCEDURE — 3075F SYST BP GE 130 - 139MM HG: CPT | Performed by: NURSE PRACTITIONER

## 2025-08-22 PROCEDURE — 3080F DIAST BP >= 90 MM HG: CPT | Performed by: NURSE PRACTITIONER

## 2025-08-22 PROCEDURE — 99213 OFFICE O/P EST LOW 20 MIN: CPT | Performed by: NURSE PRACTITIONER

## 2025-08-22 RX ORDER — LOSARTAN POTASSIUM 50 MG/1
50 TABLET ORAL DAILY
Qty: 90 TABLET | Refills: 1 | Status: SHIPPED | OUTPATIENT
Start: 2025-08-22

## 2025-08-22 ASSESSMENT — ENCOUNTER SYMPTOMS
NAUSEA: 0
NUMBNESS: 0
SHORTNESS OF BREATH: 0
CHEST TIGHTNESS: 0
HEADACHES: 0
PALPITATIONS: 0
CHILLS: 0
FATIGUE: 0
DIZZINESS: 0
COUGH: 0
VOMITING: 0
FEVER: 0
DIARRHEA: 0
ABDOMINAL PAIN: 0
WEAKNESS: 0

## 2026-02-27 ENCOUNTER — APPOINTMENT (OUTPATIENT)
Dept: PRIMARY CARE | Facility: CLINIC | Age: 41
End: 2026-02-27
Payer: COMMERCIAL